# Patient Record
Sex: FEMALE | URBAN - METROPOLITAN AREA
[De-identification: names, ages, dates, MRNs, and addresses within clinical notes are randomized per-mention and may not be internally consistent; named-entity substitution may affect disease eponyms.]

---

## 2021-02-12 ENCOUNTER — APPOINTMENT (RX ONLY)
Dept: URBAN - METROPOLITAN AREA CLINIC 374 | Facility: CLINIC | Age: 21
Setting detail: DERMATOLOGY
End: 2021-02-12

## 2021-02-12 DIAGNOSIS — L21.8 OTHER SEBORRHEIC DERMATITIS: ICD-10-CM

## 2021-02-12 PROCEDURE — ? PRESCRIPTION MEDICATION MANAGEMENT

## 2021-02-12 PROCEDURE — 99204 OFFICE O/P NEW MOD 45 MIN: CPT

## 2021-02-12 PROCEDURE — ? COUNSELING

## 2021-02-12 PROCEDURE — ? PRESCRIPTION

## 2021-02-12 RX ORDER — CLOBETASOL PROPIONATE 0.5 MG/ML
SOLUTION TOPICAL QHS
Qty: 1 | Refills: 3 | Status: ERX | COMMUNITY
Start: 2021-02-12

## 2021-02-12 RX ORDER — KETOCONAZOLE 20 MG/ML
SHAMPOO, SUSPENSION TOPICAL QDAY
Qty: 1 | Refills: 3 | Status: ERX | COMMUNITY
Start: 2021-02-12

## 2021-02-12 RX ADMIN — CLOBETASOL PROPIONATE: 0.5 SOLUTION TOPICAL at 00:00

## 2021-02-12 RX ADMIN — KETOCONAZOLE: 20 SHAMPOO, SUSPENSION TOPICAL at 00:00

## 2021-02-12 ASSESSMENT — LOCATION SIMPLE DESCRIPTION DERM: LOCATION SIMPLE: POSTERIOR SCALP

## 2021-02-12 ASSESSMENT — LOCATION DETAILED DESCRIPTION DERM: LOCATION DETAILED: POSTERIOR MID-PARIETAL SCALP

## 2021-02-12 ASSESSMENT — LOCATION ZONE DERM: LOCATION ZONE: SCALP

## 2021-02-12 NOTE — PROCEDURE: PRESCRIPTION MEDICATION MANAGEMENT
Render In Strict Bullet Format?: No
Detail Level: Zone
Initiate Treatment: Ketoconazole 2% shampoo lather and let sit for 3-5 minutes then rinse\\nClobetasol 0.05% scalp solution QHS

## 2021-03-26 ENCOUNTER — APPOINTMENT (RX ONLY)
Dept: URBAN - METROPOLITAN AREA CLINIC 374 | Facility: CLINIC | Age: 21
Setting detail: DERMATOLOGY
End: 2021-03-26

## 2021-03-26 DIAGNOSIS — L21.8 OTHER SEBORRHEIC DERMATITIS: ICD-10-CM | Status: IMPROVED

## 2021-03-26 DIAGNOSIS — L65.0 TELOGEN EFFLUVIUM: ICD-10-CM

## 2021-03-26 PROCEDURE — ? MEDICATION COUNSELING

## 2021-03-26 PROCEDURE — ? COUNSELING

## 2021-03-26 PROCEDURE — ? FOLLOW UP FOR NEXT VISIT

## 2021-03-26 PROCEDURE — ? ADDITIONAL NOTES

## 2021-03-26 PROCEDURE — 99213 OFFICE O/P EST LOW 20 MIN: CPT

## 2021-03-26 PROCEDURE — ? PRESCRIPTION MEDICATION MANAGEMENT

## 2021-03-26 PROCEDURE — ? PRESCRIPTION

## 2021-03-26 RX ORDER — DOXYCYCLINE 100 MG/1
TABLET, FILM COATED ORAL QDAY
Qty: 30 | Refills: 2 | Status: ERX | COMMUNITY
Start: 2021-03-26

## 2021-03-26 RX ADMIN — DOXYCYCLINE: 100 TABLET, FILM COATED ORAL at 00:00

## 2021-03-26 ASSESSMENT — LOCATION DETAILED DESCRIPTION DERM
LOCATION DETAILED: POSTERIOR MID-PARIETAL SCALP
LOCATION DETAILED: LEFT SUPERIOR OCCIPITAL SCALP
LOCATION DETAILED: RIGHT CENTRAL PARIETAL SCALP
LOCATION DETAILED: MID-OCCIPITAL SCALP

## 2021-03-26 ASSESSMENT — LOCATION SIMPLE DESCRIPTION DERM
LOCATION SIMPLE: POSTERIOR SCALP
LOCATION SIMPLE: LEFT OCCIPITAL SCALP
LOCATION SIMPLE: SCALP

## 2021-03-26 ASSESSMENT — LOCATION ZONE DERM: LOCATION ZONE: SCALP

## 2021-03-26 NOTE — PROCEDURE: PRESCRIPTION MEDICATION MANAGEMENT
Render In Strict Bullet Format?: No
Detail Level: Zone
Continue Regimen: Ketoconazole 2% shampoo lather and let sit for 3-5 minutes then rinse\\nClobetasol 0.05% scalp solution QHS
Continue Regimen: Cloebtasol 0.05% topical scalp solution qhs
Initiate Treatment: OTC rogaine 5% topical foam Qam to the scalp\\ndoxycycline monohydrate 100 mg tablet: Take one pill po qday with food

## 2021-03-26 NOTE — PROCEDURE: ADDITIONAL NOTES
Render Risk Assessment In Note?: no
Detail Level: Simple
Additional Notes: Per patient recent blood work obtained by PCP and all was normal

## 2021-03-26 NOTE — PROCEDURE: MEDICATION COUNSELING
Xeldoreenz Pregnancy And Lactation Text: This medication is Pregnancy Category D and is not considered safe during pregnancy.  The risk during breast feeding is also uncertain.

## 2021-05-07 ENCOUNTER — APPOINTMENT (RX ONLY)
Dept: URBAN - METROPOLITAN AREA CLINIC 374 | Facility: CLINIC | Age: 21
Setting detail: DERMATOLOGY
End: 2021-05-07

## 2021-05-07 DIAGNOSIS — L65.0 TELOGEN EFFLUVIUM: ICD-10-CM | Status: IMPROVED

## 2021-05-07 DIAGNOSIS — L53.8 OTHER SPECIFIED ERYTHEMATOUS CONDITIONS: ICD-10-CM

## 2021-05-07 DIAGNOSIS — L21.8 OTHER SEBORRHEIC DERMATITIS: ICD-10-CM | Status: WELL CONTROLLED

## 2021-05-07 PROCEDURE — ? IN-HOUSE DISPENSING PHARMACY

## 2021-05-07 PROCEDURE — 99213 OFFICE O/P EST LOW 20 MIN: CPT

## 2021-05-07 PROCEDURE — ? PRESCRIPTION MEDICATION MANAGEMENT

## 2021-05-07 PROCEDURE — ? ADDITIONAL NOTES

## 2021-05-07 PROCEDURE — ? TREATMENT REGIMEN

## 2021-05-07 PROCEDURE — ? INTRALESIONAL KENALOG

## 2021-05-07 ASSESSMENT — LOCATION SIMPLE DESCRIPTION DERM
LOCATION SIMPLE: POSTERIOR SCALP
LOCATION SIMPLE: LEFT SCALP
LOCATION SIMPLE: SCALP
LOCATION SIMPLE: LEFT OCCIPITAL SCALP
LOCATION SIMPLE: RIGHT SCALP

## 2021-05-07 ASSESSMENT — LOCATION DETAILED DESCRIPTION DERM
LOCATION DETAILED: MID-OCCIPITAL SCALP
LOCATION DETAILED: RIGHT CENTRAL FRONTAL SCALP
LOCATION DETAILED: RIGHT CENTRAL PARIETAL SCALP
LOCATION DETAILED: LEFT SUPERIOR OCCIPITAL SCALP
LOCATION DETAILED: LEFT SUPERIOR PARIETAL SCALP
LOCATION DETAILED: RIGHT SUPERIOR PARIETAL SCALP
LOCATION DETAILED: RIGHT MEDIAL FRONTAL SCALP
LOCATION DETAILED: POSTERIOR MID-PARIETAL SCALP
LOCATION DETAILED: LEFT MEDIAL FRONTAL SCALP

## 2021-05-07 ASSESSMENT — LOCATION ZONE DERM: LOCATION ZONE: SCALP

## 2021-05-07 NOTE — PROCEDURE: PRESCRIPTION MEDICATION MANAGEMENT
Continue Regimen: Clobetasol 0.05% topical scalp solution qhs\\nOTC rogaine 5% topical foam Qam to the scalp\\ndoxycycline monohydrate 100 mg tablet: Take one pill po qday with food
Detail Level: Zone
Initiate Treatment: Liat sold proscriptx conditioner
Render In Strict Bullet Format?: No
Continue Regimen: Ketoconazole 2% shampoo lather and let sit for 3-5 minutes then rinse\\nClobetasol 0.05% scalp solution QHS

## 2021-05-07 NOTE — PROCEDURE: INTRALESIONAL KENALOG
Consent: The risks of atrophy were reviewed with the patient.
Medical Necessity Clause: This procedure was medically necessary because the lesions that were treated were:
Total Volume (Ccs- Only Use Numbers And Decimals): 0.6
Detail Level: Simple
Concentration (Mg/Ml): 3.0
Kenalog Preparation: Kenalog
Administered By (Optional): Liat
Include Z78.9 (Other Specified Conditions Influencing Health Status) As An Associated Diagnosis?: No
X Size Of Lesion In Cm (Optional): 0
Treatment Number (Optional): 1

## 2021-05-07 NOTE — PROCEDURE: IN-HOUSE DISPENSING PHARMACY
Product 34 Unit Type: mg
Product 74 Units Dispensed: 0
Detail Level: Zone
Send Charges To Patient Encounter: Yes
Product 2 Refills: 3
Product 2 Amount/Unit (Numbers Only): 28
Name Of Product 1: Proscriptix Bundle (shampoo, conditioner, pills)
Product 1 Price/Unit (In Dollars): 85
Product 2 Units Dispensed: 1
Product 2 Application Directions: Condition hair QDAY
Name Of Product 2: Proscriptix Conditioner

## 2021-06-29 ENCOUNTER — RX ONLY (OUTPATIENT)
Age: 21
Setting detail: RX ONLY
End: 2021-06-29

## 2021-06-29 ENCOUNTER — APPOINTMENT (RX ONLY)
Dept: URBAN - METROPOLITAN AREA CLINIC 374 | Facility: CLINIC | Age: 21
Setting detail: DERMATOLOGY
End: 2021-06-29

## 2021-06-29 DIAGNOSIS — L65.0 TELOGEN EFFLUVIUM: ICD-10-CM | Status: IMPROVED

## 2021-06-29 DIAGNOSIS — L21.8 OTHER SEBORRHEIC DERMATITIS: ICD-10-CM | Status: WELL CONTROLLED

## 2021-06-29 PROCEDURE — 99213 OFFICE O/P EST LOW 20 MIN: CPT

## 2021-06-29 PROCEDURE — ? PRESCRIPTION MEDICATION MANAGEMENT

## 2021-06-29 RX ORDER — KETOCONAZOLE 20 MG/ML
SHAMPOO, SUSPENSION TOPICAL QDAY
Qty: 1 | Refills: 3 | Status: ERX

## 2021-06-29 RX ORDER — DOXYCYCLINE 100 MG/1
TABLET, FILM COATED ORAL QDAY
Qty: 30 | Refills: 2 | Status: ERX

## 2021-06-29 ASSESSMENT — LOCATION DETAILED DESCRIPTION DERM
LOCATION DETAILED: LEFT SUPERIOR OCCIPITAL SCALP
LOCATION DETAILED: RIGHT CENTRAL PARIETAL SCALP
LOCATION DETAILED: MID-OCCIPITAL SCALP
LOCATION DETAILED: POSTERIOR MID-PARIETAL SCALP

## 2021-06-29 ASSESSMENT — LOCATION SIMPLE DESCRIPTION DERM
LOCATION SIMPLE: SCALP
LOCATION SIMPLE: LEFT OCCIPITAL SCALP
LOCATION SIMPLE: POSTERIOR SCALP

## 2021-06-29 ASSESSMENT — LOCATION ZONE DERM: LOCATION ZONE: SCALP

## 2021-06-29 NOTE — PROCEDURE: PRESCRIPTION MEDICATION MANAGEMENT
Discontinue Regimen: doxycycline monohydrate 100 mg tablet: Take one pill po qday with food
Continue Regimen: Clobetasol 0.05% topical scalp solution qhs\\nOTC rogaine 5% topical foam Qam to the scalp\\nproscriptx conditioner
Detail Level: Zone
Render In Strict Bullet Format?: No
Continue Regimen: Ketoconazole 2% shampoo lather and let sit for 3-5 minutes then rinse\\nClobetasol 0.05% scalp solution QHS